# Patient Record
Sex: FEMALE | Race: WHITE
[De-identification: names, ages, dates, MRNs, and addresses within clinical notes are randomized per-mention and may not be internally consistent; named-entity substitution may affect disease eponyms.]

---

## 2019-04-15 ENCOUNTER — HOSPITAL ENCOUNTER (EMERGENCY)
Dept: HOSPITAL 10 - FTE | Age: 29
Discharge: HOME | End: 2019-04-15
Payer: MEDICAID

## 2019-04-15 ENCOUNTER — HOSPITAL ENCOUNTER (EMERGENCY)
Dept: HOSPITAL 91 - FTE | Age: 29
Discharge: HOME | End: 2019-04-15
Payer: MEDICAID

## 2019-04-15 VITALS — SYSTOLIC BLOOD PRESSURE: 115 MMHG | DIASTOLIC BLOOD PRESSURE: 65 MMHG | HEART RATE: 84 BPM | RESPIRATION RATE: 18 BRPM

## 2019-04-15 VITALS — BODY MASS INDEX: 33.21 KG/M2 | HEIGHT: 55 IN | WEIGHT: 143.52 LBS

## 2019-04-15 DIAGNOSIS — Z3A.08: ICD-10-CM

## 2019-04-15 DIAGNOSIS — O20.9: Primary | ICD-10-CM

## 2019-04-15 LAB
ADD MAN DIFF?: NO
ADD UMIC: NO
BASOPHIL #: 0.1 10^3/UL (ref 0–0.1)
BASOPHILS %: 0.5 % (ref 0–2)
EOSINOPHILS #: 0.1 10^3/UL (ref 0–0.5)
EOSINOPHILS %: 0.5 % (ref 0–7)
HEMATOCRIT: 33.3 % (ref 37–47)
HEMOGLOBIN: 11.2 G/DL (ref 12–16)
IMMATURE GRANS #M: 0.04 10^3/UL (ref 0–0.03)
IMMATURE GRANS % (M): 0.4 % (ref 0–0.43)
LYMPHOCYTES #: 2.8 10^3/UL (ref 0.8–2.9)
LYMPHOCYTES %: 25.5 % (ref 15–51)
MEAN CORPUSCULAR HEMOGLOBIN: 31.9 PG (ref 29–33)
MEAN CORPUSCULAR HGB CONC: 33.6 G/DL (ref 32–37)
MEAN CORPUSCULAR VOLUME: 94.9 FL (ref 82–101)
MEAN PLATELET VOLUME: 10.1 FL (ref 7.4–10.4)
MONOCYTE #: 0.6 10^3/UL (ref 0.3–0.9)
MONOCYTES %: 5.6 % (ref 0–11)
NEUTROPHIL #: 7.5 10^3/UL (ref 1.6–7.5)
NEUTROPHILS %: 67.5 % (ref 39–77)
NUCLEATED RED BLOOD CELLS #: 0 10^3/UL (ref 0–0)
NUCLEATED RED BLOOD CELLS%: 0 /100WBC (ref 0–0)
PLATELET COUNT: 258 10^3/UL (ref 140–415)
RED BLOOD COUNT: 3.51 10^6/UL (ref 4.2–5.4)
RED CELL DISTRIBUTION WIDTH: 12.6 % (ref 11.5–14.5)
UR ASCORBIC ACID: NEGATIVE MG/DL
UR BILIRUBIN (DIP): NEGATIVE MG/DL
UR BLOOD (DIP): NEGATIVE MG/DL
UR CLARITY: CLEAR
UR COLOR: YELLOW
UR GLUCOSE (DIP): NEGATIVE MG/DL
UR KETONES (DIP): NEGATIVE MG/DL
UR LEUKOCYTE ESTERASE (DIP): NEGATIVE LEU/UL
UR NITRITE (DIP): NEGATIVE MG/DL
UR PH (DIP): 6 (ref 5–9)
UR SPECIFIC GRAVITY (DIP): 1.02 (ref 1–1.03)
UR TOTAL PROTEIN (DIP): NEGATIVE MG/DL
UR UROBILINOGEN (DIP): NEGATIVE MG/DL
WHITE BLOOD COUNT: 11 10^3/UL (ref 4.8–10.8)

## 2019-04-15 PROCEDURE — 84702 CHORIONIC GONADOTROPIN TEST: CPT

## 2019-04-15 PROCEDURE — 76801 OB US < 14 WKS SINGLE FETUS: CPT

## 2019-04-15 PROCEDURE — 99284 EMERGENCY DEPT VISIT MOD MDM: CPT

## 2019-04-15 PROCEDURE — 86900 BLOOD TYPING SEROLOGIC ABO: CPT

## 2019-04-15 PROCEDURE — 86901 BLOOD TYPING SEROLOGIC RH(D): CPT

## 2019-04-15 PROCEDURE — 85025 COMPLETE CBC W/AUTO DIFF WBC: CPT

## 2019-04-15 PROCEDURE — 81003 URINALYSIS AUTO W/O SCOPE: CPT

## 2019-04-15 RX ADMIN — ACETAMINOPHEN 1 MG: 325 TABLET, FILM COATED ORAL at 17:26

## 2019-04-15 NOTE — ERD
ER Documentation


Chief Complaint


Chief Complaint





8 WKS PREG VAG BLEED SINCE THIS MORNING WITH LOW ABD PAIN. SOME NAUSEA





HPI


This is a 28-year-old patient who presents to the emergency room with complaint 


of dark red vaginal bleeding this morning, no current active bleeding, approx 8 


weeks pregnant.  States she had spotting 2-3 times today.  OB sent her to the 


hospital.  Denies fevers, no nausea, vomiting, diarrhea.  No complications with 


prior pregnancy. Well appearing, nad.








LMP 19


FABRIZIO 19





ROS


All systems reviewed and are negative except as per history of present illness.





Allergies


Allergies:  


Coded Allergies:  


     No Known Allergies (Verified  Allergy, Unknown, 08)





PMhx/Soc


Medical and Surgical Hx:  pt denies Medical Hx





FmHx


Family History:  No diabetes, No coronary disease, No other





Physical Exam


Vitals





Vital Signs


  Date      Temp  Pulse  Resp  B/P (MAP)   Pulse Ox  O2          O2 Flow    FiO2


Time                                                 Delivery    Rate


   4/15/19  98.1     84    18      115/65        99


     16:38                           (82)





Physical Exam


Const:   No acute distress


Head:   Atraumatic 


Eyes:    Normal Conjunctiva, PERRL


ENT:    Normal External Ears, Nose and Mouth.


Neck:               Full range of motion. No meningismus.


Resp:   Clear to auscultation bilaterally


Cardio:   Regular rate and rhythm, no murmurs


Abd:    Soft, tender @ RLQ, non distended. Normal bowel sounds


Skin:   No petechiae or rashes


Back:   No midline or flank tenderness, no CVT


Ext:    No cyanosis, or edema


Neur:   Awake and alert


Psych:    Normal Mood and Affect


Result Diagram:  


4/15/19 5303





Results 24 hrs





Laboratory Tests


     Test
                                  4/15/19
17:25    4/15/19
17:28


     Urine Color                          YELLOW


     Urine Clarity                        CLEAR


     Urine pH                                        6.0


     Urine Specific Gravity                        1.023


     Urine Ketones                        NEGATIVE mg/dL


     Urine Nitrite                        NEGATIVE mg/dL


     Urine Bilirubin                      NEGATIVE mg/dL


     Urine Urobilinogen                   NEGATIVE mg/dL


     Urine Leukocyte Esterase
            NEGATIVE
Nehemias/ul  



     Urine Hemoglobin                     NEGATIVE mg/dL


     Urine Glucose                        NEGATIVE mg/dL


     Urine Total Protein                  NEGATIVE mg/dl


     White Blood Count                                       11.0 10^3/ul


     Red Blood Count                                         3.51 10^6/ul


     Hemoglobin                                                 11.2 g/dl


     Hematocrit                                                    33.3 %


     Mean Corpuscular Volume                                      94.9 fl


     Mean Corpuscular Hemoglobin                                  31.9 pg


     Mean Corpuscular Hemoglobin
Concent  
                    33.6 g/dl 



     Red Cell Distribution Width                                   12.6 %


     Platelet Count                                           258 10^3/UL


     Mean Platelet Volume                                         10.1 fl


     Immature Granulocytes %                                      0.400 %


     Neutrophils %                                                 67.5 %


     Lymphocytes %                                                 25.5 %


     Monocytes %                                                    5.6 %


     Eosinophils %                                                  0.5 %


     Basophils %                                                    0.5 %


     Nucleated Red Blood Cells %                              0.0 /100WBC


     Immature Granulocytes #                                0.040 10^3/ul


     Neutrophils #                                            7.5 10^3/ul


     Lymphocytes #                                            2.8 10^3/ul


     Monocytes #                                              0.6 10^3/ul


     Eosinophils #                                            0.1 10^3/ul


     Basophils #                                              0.1 10^3/ul


     Nucleated Red Blood Cells #                              0.0 10^3/ul


     Beta HCG, Quantitative
              
                609118.0
mIU/ml





Current Medications


 Medications
   Dose
          Sig/Tamica
       Start Time
   Status  Last


 (Trade)       Ordered        Route
 PRN     Stop Time              Admin
Dose


                              Reason                                Admin


                650 mg         ONCE  STAT
    4/15/19       DC           4/15/19


Acetaminophen                 PO
            17:19
                       17:26




  (Tylenol                                  4/15/19 17:22


Tab)








Procedures/MDM


This is a 28-year-old female patient who presents to the emergency room with 


complaint of vaginal bleeding today.  Patient is .





ED COURSE:


The patient was stable throughout ED course. I kept the patient and/or family 


informed of laboratory and diagnostic imaging results throughout the ED course. 











DIAGNOSTIC IMAGING:


Single live intrauterine pregnancy with an estimated gestational age of 8 weeks 


1 day. This yields an estimated due date of 2019, concordant with 


menstrual dates.  


Read by radiologist.








MEDICATIONS GIVEN: 


Tylenol


Patient tolerated medication well with no adverse reactions. Patient reported 


improvement in pain. 





MDM: 


Patient's bleeding symptoms have stabilized while in the department.


This patient is experiencing bleeding in early pregnancy. Exam results negative 


for UTI, infection, anemia, ABO mismatch. HCG consistent with reported 


gestation. No evidence of symptomatic anemia, ectopic pregnancy, sepsis, or 


surgical abdomen.


Extensive discussion with family and patient that occult disease or threatened 


 cannot be ruled out.


Patient to follow up with OB/Gyn tomorrow for re-examination. All lab work and 


US results provided. 





DISPOSITION: The patient has been discharge home to follow-up with community 


physician.





Departure


Diagnosis:  


   Primary Impression:  


   Vaginal bleeding in pregnant patient at less than 20 weeks gestation


Condition:  Stable


Patient Instructions:  Possible Miscarriage (Threatened )


Referrals:  


[facility]





Additional Instructions:  


Thank you very much for allowing us to participate in your care. 


Your health and safety is our top priority at [facility].





Call your primary care doctor TOMORROW for an appointment during the next 2-4 


days and bring all the information and medications prescribed. 





Have prescriptions filled and follow precisely the directions on the label. 





If the symptoms get worse and your provider is unavailable, return to the 


Emergency Department immediately.











ERIC ALMAZAN NP              Apr 15, 2019 17:28